# Patient Record
Sex: MALE | Race: WHITE | NOT HISPANIC OR LATINO | Employment: FULL TIME | URBAN - METROPOLITAN AREA
[De-identification: names, ages, dates, MRNs, and addresses within clinical notes are randomized per-mention and may not be internally consistent; named-entity substitution may affect disease eponyms.]

---

## 2022-02-16 DIAGNOSIS — K64.4 EXTERNAL HEMORRHOIDS: Primary | ICD-10-CM

## 2022-02-21 ENCOUNTER — OFFICE VISIT (OUTPATIENT)
Dept: SURGERY | Facility: CLINIC | Age: 32
End: 2022-02-21
Payer: COMMERCIAL

## 2022-02-21 DIAGNOSIS — K64.4 EXTERNAL HEMORRHOIDS: ICD-10-CM

## 2022-02-21 PROCEDURE — 1159F MED LIST DOCD IN RCRD: CPT | Mod: CPTII,S$GLB,, | Performed by: SURGERY

## 2022-02-21 PROCEDURE — 99202 PR OFFICE/OUTPT VISIT, NEW, LEVL II, 15-29 MIN: ICD-10-PCS | Mod: S$GLB,,, | Performed by: SURGERY

## 2022-02-21 PROCEDURE — 1159F PR MEDICATION LIST DOCUMENTED IN MEDICAL RECORD: ICD-10-PCS | Mod: CPTII,S$GLB,, | Performed by: SURGERY

## 2022-02-21 PROCEDURE — 99202 OFFICE O/P NEW SF 15 MIN: CPT | Mod: S$GLB,,, | Performed by: SURGERY

## 2022-02-21 PROCEDURE — 1160F RVW MEDS BY RX/DR IN RCRD: CPT | Mod: CPTII,S$GLB,, | Performed by: SURGERY

## 2022-02-21 PROCEDURE — 1160F PR REVIEW ALL MEDS BY PRESCRIBER/CLIN PHARMACIST DOCUMENTED: ICD-10-PCS | Mod: CPTII,S$GLB,, | Performed by: SURGERY

## 2022-02-21 NOTE — PROGRESS NOTES
History & Physical    SUBJECTIVE:     History of Present Illness:    32-year-old male who has been having a hemorrhoid problem since January when initially noted a hard knot developed around the anus on the left side.  Since that time it has decreased in size but continues to cause some tenderness and discomfort especially with bowel movements.  It has decreased in size considerably he says.  No previous hemorrhoid issues.  He does run and lift weights frequently.    Chief Complaint   Patient presents with    Hemorrhoids     Pain with BM, bleeding         Review of patient's allergies indicates:  Review of patient's allergies indicates:  No Known Allergies    No current outpatient medications on file prior to visit.     No current facility-administered medications on file prior to visit.       History reviewed. No pertinent past medical history.  Past Surgical History:   Procedure Laterality Date    MYRINGOTOMY W/ TUBES       Family History   Problem Relation Age of Onset    Hypertension Mother     Hypertension Paternal Uncle     Heart disease Paternal Uncle        Social History     Socioeconomic History    Marital status: Single   Tobacco Use    Smoking status: Never Smoker    Smokeless tobacco: Never Used          Review of Systems   Constitutional: Negative.    Respiratory: Negative.    Cardiovascular: Negative.    Gastrointestinal: Negative.    Musculoskeletal: Negative.    Neurological: Negative.    Endo/Heme/Allergies: Negative.    Psychiatric/Behavioral: Negative.        OBJECTIVE:     There were no vitals filed for this visit.              Physical Exam:  Physical Exam  Genitourinary:         Comments: On left side of the anus there is noted to be a hemorrhoidal skin tag with a small thrombosed hemorrhoid that appears to be resolving some.  This is located at the 2 o'clock position with the patient supine.  No significant internal            ASSESSMENT/PLAN:   External hemorrhoid with history of  thrombosis with continued tenderness.  PLAN:  Patient given options of surgical excision of external hemorrhoid versus continued conservative medical treatment.  We will add lidocaine jelly and continue Sitz baths and if no improvement in a week or 2 he will call us back to schedule surgery.